# Patient Record
Sex: MALE | Race: WHITE
[De-identification: names, ages, dates, MRNs, and addresses within clinical notes are randomized per-mention and may not be internally consistent; named-entity substitution may affect disease eponyms.]

---

## 2017-01-10 NOTE — ER DOCUMENT REPORT
ED Neck/Back Problem





- General


Chief Complaint: Low Back Pain


Stated Complaint: LOWER BACK PAIN


Mode of Arrival: Ambulatory


Notes: 


Patient says that he awakened this morning with severe lower lumbar back pain 

that was so severe that it was difficult for him to get out of bed.  He had 

noticed pain in the lumbar region of his back off and on during the past week, 

but significantly worsened this morning.  He does not recall any unusual 

activity or anything that would injure or strain his back recently.  Does not 

work.  Denies paralysis, numbness, loss of control of bladder or bowels has 

never had anything like this before.





In addition, patient says that since last Friday, he's had cold symptoms with 

often runny nose and a lot of sneezing and generalized headache, sore throat, 

and felt as if he might have a fever.  He went and got a flu shot on Friday.  

His symptoms have not improved.


TRAVEL OUTSIDE OF THE [place] IN LAST 30 DAYS: No





- Related Data


Allergies/Adverse Reactions: 


 





fire ant Allergy (Verified 01/10/17 13:53)


 


Penicillins Allergy (Verified 01/10/17 13:53)


 











Past Medical History





- General


Information source: Patient





- Social History


Smoking Status: Former Smoker


Cigarette use (# per day): No - although he says he has smoked a few cigarettes 

this week because of back p


Chew tobacco use (# tins/day): Yes


Family History: Reviewed & Not Pertinent


Patient has suicidal ideation: No


Patient has homicidal ideation: No


Musculoskeltal Medical History: Reports None


Past Surgical History: Reports: Hx Abdominal Surgery - Ruptured appendix, Hx 

Appendectomy





- Immunizations


Immunizations up to date: Yes


Hx Diphtheria, Pertussis, Tetanus Vaccination: No





Review of Systems





- Review of Systems


Notes: 


REVIEW OF SYSTEMS:


CONSTITUTIONAL :  Denies fever.  


EENT:   Denies eye, ear, nose or mouth or throat pain or other symptoms.


CARDIOVASCULAR:  Denies chest pain.


RESPIRATORY:  Denies cough, chest congestion, or shortness of breath.


GASTROINTESTINAL:  Denies abdominal pain or nausea, vomiting, or diarrhea.


GENITOURINARY:  Denies difficulty or painful urinating, urinary frequency, 

blood in urine.


MUSCULOSKELETAL: See history of present illness.  Denies joint pain or swelling.


SKIN:   Denies rash or skin lesions.


NEUROLOGICAL:  Denies LOC or altered mental status.  Denies headache.  Denies 

sensory loss or motor deficits.





ALL OTHER SYSTEMS REVIEWED AND NEGATIVE.





Physical Exam





- Vital signs


Vitals: 


 











Temp Pulse Resp BP Pulse Ox


 


 98.1 F   100   16   138/100 H  99 


 


 01/10/17 13:34  01/10/17 13:34  01/10/17 13:34  01/10/17 13:34  01/10/17 13:34











Interpretation: Normal, Hypertensive - Very slight elevation





- Notes


Notes: 


PHYSICAL EXAMINATION:





GENERAL: Well-appearing, in no acute distress.  Blood pressure is just slightly 

elevated.  Afebrile.  Moves about on the stretcher without much indication of 

pain.





HEAD: Atraumatic, normocephalic.  Does not sound very congested.





EYES: Pupils equal round and reactive to light, extraocular movements intact.





ENT: oropharynx clear without exudates.  Moist mucous membranes.





NECK: Normal range of motion, supple.





LUNGS: Breath sounds clear and equal bilaterally.  No wheezes heard.





HEART: Regular rate and rhythm without murmurs.  No tachycardia. 





ABDOMEN: Soft, nontender.  No guarding or rebound.  No masses.  No bruits 

heard.  Good femoral pulses bilaterally.





BACK:  No tenderness throughout entire back.  Indicates pain at the mid lumbar 

spine region.  Straight leg raising negative.  Excellent pulses in the feet.





EXTREMITIES: Normal range of motion without pain.





NEUROLOGICAL:  Normal speech, normal gait.  Normal sensory, motor, and reflex 

exams.  Awake, alert, and oriented x3.  Cranial nerves normal.





PSYCH: Normal mood, normal affect.





SKIN: Warm, dry, no rashes.





Course





- Vital Signs


Vital signs: 


 











Temp Pulse Resp BP Pulse Ox


 


 98.1 F   100   16   138/100 H  99 


 


 01/10/17 13:34  01/10/17 13:34  01/10/17 13:34  01/10/17 13:34  01/10/17 13:34














- Diagnostic Test


Radiology reviewed: Pending, Image reviewed, Reports reviewed - X-rays show 

early osteophyte formation, but nothing else of significance.





Discharge





- Discharge


Clinical Impression: 


Lumbar back pain


Qualifiers:


 Chronicity: acute Back pain laterality: midline Sciatica presence: without 

sciatica Qualified Code(s): M54.5 - Low back pain





URI (upper respiratory infection)


Qualifiers:


 URI type: unspecified viral URI Qualified Code(s): J06.9 - Acute upper 

respiratory infection, unspecified





Condition: Stable


Disposition: HOME, SELF-CARE


Additional Instructions: 


LOW BACK PAIN:





     Three out of every four people will have an episode of disabling back pain 

during their lifetime.  Most commonly the pain is due to straining of the 

muscles and ligaments in the low back.


     Usual treatment includes: 


(1) Rest on a firm surface.  Avoid lying on your stomach.  


(2) Ice pack the painful area.  After a few days, gentle heat may be used 

intermittently to relax the area, or ice packs can be continued.  


(3) Medication may be needed -- muscle relaxers and antiinflammatory medicines 

are commonly used.  


(4) As the back improves, exercises are prescribed to strengthen the back and 

abdominal muscles.


     Your doctor will advise you on the proper care for your back at each stage 

in your recovery.  You may be better in a few days -- or healing may take 

several weeks.


     If new symptoms of a "herniated disc" (radiation of pain, numbness, or 

tingling down the back of the leg or weakness in the leg) occur, you should be 

re-examined.  Further testing may be necessary.





Toradol Injection





     You have been given an injection of ketorolac tromethamine (Toradol).  

This is an excellent, safe drug for pain control.  It also has potent 

antiinflammatory action.  You should have significant pain relief within about 

one hour.


     Toradol is not addicting and is non-sedating.  It does not interfere with 

driving or work.


     Call or return if you develop itching, hives, shortness of breath, or rash.








MUSCLE RELAXERS: 


     Muscle relaxing medications are usually prescribed for acute muscle spasm 

or injury to the neck and back.  They are often combined with antiinflammatory 

pain medication for increased relief.


     You may stop the muscle relaxer when the pain and stiffness have improved.

  Start the medication again if spasms recur.  


     Muscle relaxers may cause drowsiness, especially with the first dose.  Do 

not operate machinery or drive while under the effects of the medication.  Most 

muscle relaxers last up to 24 hours.  Do not combine the medication with 

alcohol.





Ultram


     Ultram is an excellent drug for pain relief.  It is not a narcotic, but it 

works in a similar way.  Ultram can take up to two hours for full effect.


     Although not addicting, Ultram is best avoided in patients with a history 

of drug abuse.


     Ultram should not be used with alcohol, sleeping pills, or narcotics.  If 

you're prone to seizures, Ultram can make you more likely to have a seizure.  

Ultram can be hazardous when combined with MAO-inhibitor antidepressants (such 

as Nardil or Parnate).  Be sure your doctor is aware of all medicines you are 

taking.


     Persons with severe liver or kidney disease should increase the time 

between doses of Ultram.  Discuss this with your doctor if you're uncertain.


     Side effects of Ultram can include dizziness, nausea, constipation, 

sleepiness, and itching.  (These side effects are also seen with narcotic pain 

medicines.)  Please call your doctor if you have other disturbing effects.





Ibuprofen


     Ibuprofen is an excellent, safe drug for pain control.  In addition, it 

has potent antiinflammatory effects which are beneficial, especially in the 

treatment of injuries, arthritis, or tendonitis. It's best to take ibuprofen 

with food.  Persons with ulcer disease or allergy to aspirin should notify 

their physician of this before taking ibuprofen.


     Take the medication exactly as prescribed.  Don't take additional doses 

unless instructed to do so by your doctor.  If you develop wheezing, shortness 

of breath, hives, faintness, stomach pain, vomiting, or dark black stools, 

return for re-evaluation at once.





USE OF ACETAMINOPHEN (Tylenol):


     Acetaminophen may be taken for pain relief or fever control. It's much 

safer than aspirin, offering a wider range of "safe" dosages.  It is safe 

during pregnancy.  Some brand names are Tylenol, Panadol, Datril, Anacin 3, 

Tempra, and Liquiprin. Acetaminophen can be repeated every four hours.  The 

following are maximum recommended dosages:





WEIGHT         Dose             Drops                  Elixir        Chewable(

80mg)


(LBS.)                            drprs=droppers    tsp=teaspoon


>89 pounds or adults          650 mg to 900 mg





Acetaminophen can be repeated every four hours.  Maximum dose not to exceed 

4000 mg a day.





   These maximum recommended dosages are slightly higher than the dosages 

written on the product container, but these dosages are very safe and below the 

toxic dosage for acetaminophen.





ICE PACKS:


     Apply ice packs frequently against the painful area.  Many different 

schedules are recommended, such as "20 minutes on, 20 minutes off" or "one hour 

ice, two hours rest."  If you need to work, you may need to go longer between 

ice treatments.  You should plan to have the area ice packed AT LEAST one 

fourth of the time.


     The ice should be applied over the wrap, tape, or splint, or over a layer 

of cloth -- not directly against the skin.  Some ice bags have a built-in cloth 

and can be put directly on the skin.





WARM PACKS:


     After approximately two days, apply gentle heat (such as a heating pad or 

hot water bottle) for about 20 to 30 minutes about every two hours -- at least 

four times daily.  Warmth and elevation will help you make a more rapid recovery

, and will ease the pain considerably.


     Do not use HOT heat, and never apply heat for longer than 30 minutes.  The 

continuous heat can invisibly damage skin and muscles -- even when no burn is 

seen on the surface.  Damaged muscles can make you MORE sore.





FOLLOW-UP CARE:


If you have been referred to a physician for follow-up care, call the physician

s office for an appointment as you were instructed or within the next two days.

  If you experience worsening or a significant change in your symptoms, notify 

the physician immediately or return to the Emergency Department at any time for 

re-evaluation.





Try the muscle relaxer and Ultram along with ibuprofen and Tylenol for the next 

few days.  If your symptoms have not gotten better in a week, follow-up with 

your primary care or you can try to make an appointment to see the back surgeon 

whom I've referred you to and his contact information is elsewhere in these 

discharge instructions.


























UPPER RESPIRATORY ILLNESS:


     You have a viral infection of the respiratory passages -- a "cold."  This 

common infection causes nasal congestion, drainage, and often sore throat and 

cough.  It is highly contagious.  The disease usually lasts about 10 to 14 days.


     There is no "cure" for the viral infection -- it must run its course.  If 

there is a complication, such as bacterial infection in the nose, sinuses, 

middle ear, or bronchial tubes, antibiotics may be required.  The antibiotics 

won't affect the virus.


     Drink plenty of fluids.  A humidifier may help.  An expectorant medication 

or decongestant may make you more comfortable.  Use acetaminophen or ibuprofen 

for fever or aches.


     See the doctor if fever persists over two days, if there is any 

significant worsening of your symptoms, or if you simply fail to improve as 

expected.





Viral Syndrome


     The physician has diagnosed a viral infection.  Viruses not only cause 

"colds," but can cause many different symptoms including generalized aching, 

fever, headache, cough, diarrhea, nausea, vomiting, and fatigue.


     The treatment, for the most part, is simply relief of symptoms. This means 

that antibiotics are usually not given.  Rest, fluids, pain medications and, 

occasionally, medication for the specific symptoms that are most bothersome 

will be prescribed. Use good handwashing to avoid passing the virus to others. 

Shared toys should be cleaned with disinfectant. Clean the toilets, sinks, and 

counter surfaces in bathrooms. Launder clothing in hot water.


     Contact the physician if you develop any new or unusual symptoms such as 

severe headache, stiff neck, high fever, chest pain, productive cough, or 

shortness of breath.  You should be rechecked if you don't see marked 

improvement within seven to 10 days.








USE OF ACETAMINOPHEN (Tylenol):


     Acetaminophen may be taken for pain relief or fever control. It's much 

safer than aspirin, offering a wider range of "safe" dosages.  It is safe 

during pregnancy.  Some brand names are Tylenol, Panadol, Datril, Anacin 3, 

Tempra, and Liquiprin. Acetaminophen can be repeated every four hours.  The 

following are maximum recommended dosages:


>89 pounds or adults          650 mg to 900 mg


Acetaminophen can be repeated every four hours.  Maximum dose not to exceed 

4000 mg a day.








FOLLOW-UP CARE:


If you have been referred to a physician for follow-up care, call the physician

s office for an appointment as you were instructed or within the next two days.

  If you experience worsening or a significant change in your symptoms, notify 

the physician immediately or return to the Emergency Department at any time for 

re-evaluation.





Prescriptions: 


Tramadol HCl [Ultram 50 mg Tablet] 50 mg PO Q4HP PRN #15 tab


 PRN Reason: 


Methocarbamol [Robaxin 500 mg Tablet] 1,000 mg PO QID #60 tablet


Referrals: 


CARLYLE MORALES MD [ASSOCIATE] - Follow up as needed

## 2017-01-10 NOTE — ER DOCUMENT REPORT
ED Medical Screen (RME)





- General


Stated Complaint: LOWER BACK PAIN


Time seen by provider: 13:49


Mode of Arrival: Ambulatory


Information source: Patient


Notes: 


51-year-old male complaining of low back pain since last night which radiates 

into her left buttocks.  No saddle anesthesia.  No fever or chills.  He points 

to his left sacral iliac area for the pain location.


TRAVEL OUTSIDE OF THE [place] IN LAST 30 DAYS: No





- Related Data


Allergies/Adverse Reactions: 


 





fire ant Allergy (Verified 09/23/16 14:46)


 


Penicillins Allergy (Verified 09/23/16 14:46)


 











Past Medical History





- Immunizations


Immunizations up to date: Yes


Hx Diphtheria, Pertussis, Tetanus Vaccination: No





Physical Exam





- Vital signs


Vitals: 





 











Temp Pulse Resp BP Pulse Ox


 


 98.1 F   100   16   138/100 H  99 


 


 01/10/17 13:34  01/10/17 13:34  01/10/17 13:34  01/10/17 13:34  01/10/17 13:34














Course





- Vital Signs


Vital signs: 





 











Temp Pulse Resp BP Pulse Ox


 


 98.1 F   100   16   138/100 H  99 


 


 01/10/17 13:34  01/10/17 13:34  01/10/17 13:34  01/10/17 13:34  01/10/17 13:34
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
## 2017-01-21 NOTE — ER DOCUMENT REPORT
ED Medical Screen (RME)





- General


Stated Complaint: BACK PAIN


Time seen by provider: 11:44


Mode of Arrival: Ambulatory


Information source: Patient


Notes: 


51-year-old male checking back into the emergency room because he wants a 

refill on muscle relaxers.  He was able to get a refill on Ultram which is 

helping his back pain but he also wants the muscle relaxer which helps.


TRAVEL OUTSIDE OF THE [place] IN LAST 30 DAYS: No





- Related Data


Allergies/Adverse Reactions: 


 





fire ant Allergy (Verified 01/10/17 13:53)


 


Penicillins Allergy (Verified 01/10/17 13:53)


 











Past Medical History


Past Surgical History: Reports: Hx Abdominal Surgery - Ruptured appendix, Hx 

Appendectomy





- Immunizations


Immunizations up to date: Yes


Hx Diphtheria, Pertussis, Tetanus Vaccination: No

## 2018-11-30 ENCOUNTER — HOSPITAL ENCOUNTER (EMERGENCY)
Dept: HOSPITAL 62 - ER | Age: 53
Discharge: HOME | End: 2018-11-30
Payer: COMMERCIAL

## 2018-11-30 VITALS — DIASTOLIC BLOOD PRESSURE: 95 MMHG | SYSTOLIC BLOOD PRESSURE: 120 MMHG

## 2018-11-30 DIAGNOSIS — F17.200: ICD-10-CM

## 2018-11-30 DIAGNOSIS — M79.10: Primary | ICD-10-CM
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
## 2018-11-30 PROCEDURE — 99283 EMERGENCY DEPT VISIT LOW MDM: CPT

## 2018-11-30 PROCEDURE — 96372 THER/PROPH/DIAG INJ SC/IM: CPT

## 2018-11-30 PROCEDURE — 72050 X-RAY EXAM NECK SPINE 4/5VWS: CPT
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
